# Patient Record
Sex: MALE | Race: WHITE | NOT HISPANIC OR LATINO | Employment: OTHER | URBAN - METROPOLITAN AREA
[De-identification: names, ages, dates, MRNs, and addresses within clinical notes are randomized per-mention and may not be internally consistent; named-entity substitution may affect disease eponyms.]

---

## 2017-03-21 ENCOUNTER — TELEPHONE (OUTPATIENT)
Dept: RHEUMATOLOGY | Facility: CLINIC | Age: 77
End: 2017-03-21

## 2017-03-22 ENCOUNTER — OFFICE VISIT (OUTPATIENT)
Dept: RHEUMATOLOGY | Facility: CLINIC | Age: 77
End: 2017-03-22
Payer: MEDICARE

## 2017-03-22 ENCOUNTER — HOSPITAL ENCOUNTER (OUTPATIENT)
Dept: RADIOLOGY | Facility: HOSPITAL | Age: 77
Discharge: HOME | End: 2017-03-22
Attending: INTERNAL MEDICINE
Payer: MEDICARE

## 2017-03-22 VITALS
WEIGHT: 226.88 LBS | HEART RATE: 64 BPM | RESPIRATION RATE: 18 BRPM | SYSTOLIC BLOOD PRESSURE: 136 MMHG | DIASTOLIC BLOOD PRESSURE: 76 MMHG

## 2017-03-22 DIAGNOSIS — M25.40 JOINT SWELLING: ICD-10-CM

## 2017-03-22 DIAGNOSIS — M25.40 JOINT SWELLING: Primary | ICD-10-CM

## 2017-03-22 PROCEDURE — 99999 PR PBB SHADOW E&M-EST. PATIENT-LVL III: CPT | Mod: PBBFAC,,, | Performed by: INTERNAL MEDICINE

## 2017-03-22 PROCEDURE — 1157F ADVNC CARE PLAN IN RCRD: CPT | Mod: S$GLB,,, | Performed by: INTERNAL MEDICINE

## 2017-03-22 PROCEDURE — 1159F MED LIST DOCD IN RCRD: CPT | Mod: S$GLB,,, | Performed by: INTERNAL MEDICINE

## 2017-03-22 PROCEDURE — 77077 JOINT SURVEY SINGLE VIEW: CPT | Mod: TC

## 2017-03-22 PROCEDURE — 99205 OFFICE O/P NEW HI 60 MIN: CPT | Mod: S$GLB,,, | Performed by: INTERNAL MEDICINE

## 2017-03-22 PROCEDURE — 1125F AMNT PAIN NOTED PAIN PRSNT: CPT | Mod: S$GLB,,, | Performed by: INTERNAL MEDICINE

## 2017-03-22 PROCEDURE — 77077 JOINT SURVEY SINGLE VIEW: CPT | Mod: 26,,, | Performed by: RADIOLOGY

## 2017-03-22 PROCEDURE — 1160F RVW MEDS BY RX/DR IN RCRD: CPT | Mod: S$GLB,,, | Performed by: INTERNAL MEDICINE

## 2017-03-22 RX ORDER — PREDNISONE 20 MG/1
TABLET ORAL
Qty: 20 TABLET | Refills: 0 | Status: SHIPPED | OUTPATIENT
Start: 2017-03-22 | End: 2017-05-17

## 2017-03-22 RX ORDER — CLOPIDOGREL BISULFATE 75 MG/1
TABLET ORAL
COMMUNITY
Start: 2016-12-18 | End: 2019-06-11

## 2017-03-22 RX ORDER — ALLOPURINOL 100 MG/1
100 TABLET ORAL DAILY
COMMUNITY
End: 2017-03-31 | Stop reason: ALTCHOICE

## 2017-03-22 RX ORDER — NAPROXEN SODIUM 220 MG/1
TABLET ORAL
COMMUNITY

## 2017-03-22 RX ORDER — SIMVASTATIN 80 MG/1
TABLET, FILM COATED ORAL
COMMUNITY
Start: 2017-01-07 | End: 2019-06-11

## 2017-03-22 RX ORDER — METOPROLOL TARTRATE 50 MG/1
TABLET ORAL
COMMUNITY
Start: 2017-01-07 | End: 2019-06-11

## 2017-03-22 RX ORDER — HYDROCHLOROTHIAZIDE 25 MG/1
TABLET ORAL
COMMUNITY
Start: 2016-12-18 | End: 2019-06-11

## 2017-03-22 RX ORDER — CELECOXIB 100 MG/1
CAPSULE ORAL
COMMUNITY
Start: 2017-03-20 | End: 2017-05-17

## 2017-03-22 RX ORDER — CAPTOPRIL 50 MG/1
50 TABLET ORAL 2 TIMES DAILY
COMMUNITY
End: 2019-06-11

## 2017-03-22 RX ORDER — POTASSIUM CHLORIDE 750 MG/1
TABLET, EXTENDED RELEASE ORAL
COMMUNITY
Start: 2016-12-18 | End: 2019-06-11

## 2017-03-22 ASSESSMENT — ROUTINE ASSESSMENT OF PATIENT INDEX DATA (RAPID3)
MDHAQ FUNCTION SCORE: .2
PAIN SCORE: 10
PSYCHOLOGICAL DISTRESS SCORE: 1.1
TOTAL RAPID3 SCORE: 5.22
WHEN YOU AWAKENED IN THE MORNING OVER THE LAST WEEK, PLEASE INDICATE THE AMOUNT OF TIME IT TAKES UNTIL YOU ARE AS LIMBER AS YOU WILL BE FOR THE DAY: 2 HR
FATIGUE SCORE: 0
PATIENT GLOBAL ASSESSMENT SCORE: 5

## 2017-03-22 NOTE — PROGRESS NOTES
Subjective:       Patient ID: Teddy Thomas is a 77 y.o. male.    Chief Complaint: Disease Management    HPI    78 yo with PMH of HTN, HL, CAD s/p stent,gout here for evaluation.  Reports pain in right wrist.  A few weeks ago, he had pain and swelling in left knee  And then it went to left ankle.  He reports swelling lasted a week and it went away.  He was placed on celebrex with no improvement.  He took prednisone for left ankle pain and it worked really fast. He reports he has had podagra in the past.  He has not been on regimen for gout.He has taken colcrys in the past with no improvement.  He reports stiffness in right wrist and right hand.  He does not drink alcohol. He avoids shell fish. Denies smoking.      Past Medical History:   Diagnosis Date    Arthritis     Gout     Hyperlipidemia     Hypertension     MI (myocardial infarction)     Myocardial infarction              Review of Systems   Constitutional: Negative for activity change, appetite change, chills, diaphoresis, fatigue and fever.   HENT: Negative for ear discharge, ear pain, hearing loss, mouth sores, nosebleeds and sinus pressure.    Eyes: Negative.  Negative for photophobia, pain, discharge, redness and itching.   Respiratory: Negative for cough, chest tightness and shortness of breath.    Cardiovascular: Negative for chest pain, palpitations and leg swelling.   Gastrointestinal: Negative for abdominal distention, blood in stool and nausea.   Endocrine: Negative for cold intolerance, heat intolerance, polydipsia and polyphagia.   Genitourinary: Negative for flank pain, genital sores and hematuria.   Musculoskeletal: Positive for arthralgias and joint swelling. Negative for back pain, gait problem, myalgias, neck pain and neck stiffness.   Skin: Negative for color change, pallor and rash.   Neurological: Negative for dizziness, weakness, light-headedness and headaches.   Hematological: Negative for adenopathy. Does not bruise/bleed easily.    Psychiatric/Behavioral: Negative for decreased concentration and hallucinations. The patient is not nervous/anxious.              Objective:   Resp 18  Wt 102.9 kg (226 lb 13.7 oz)     Physical Exam   Constitutional: No distress.   HENT:   Head: Normocephalic and atraumatic.   Right Ear: External ear normal.   Left Ear: External ear normal.   Eyes: Conjunctivae and EOM are normal. Pupils are equal, round, and reactive to light. Right eye exhibits no discharge. Left eye exhibits discharge. No scleral icterus.   Neck: Normal range of motion. Neck supple. No JVD present. No tracheal deviation present. No thyromegaly present.   Cardiovascular: Normal rate, regular rhythm, normal heart sounds and intact distal pulses.  Exam reveals no gallop and no friction rub.    No murmur heard.  Pulmonary/Chest: Effort normal and breath sounds normal. No stridor. No respiratory distress. He has no wheezes. He has no rales. He exhibits no tenderness.   Abdominal: Soft. Bowel sounds are normal. He exhibits no distension and no mass. There is no tenderness. There is no rebound and no guarding.   Lymphadenopathy:     He has no cervical adenopathy.   Skin: He is not diaphoretic.     Musculoskeletal:   Shoulders:FROM  Wrist:: synovitis in right wrist  Knees, ankles; no effusions  Feet: no synovitis                Assessment:     78 yo with PMH of HTN, HL, CAD s/p stent,gout here for evaluation.  He has right wrist tenderness but no swelling. I suspect he has gout vs palindromic RA.    No diagnosis found.        Plan:     stop celebrex  Start prednisone 40mg a day for 4 days then one pill daily  Labs, xrays  rtc next week  **

## 2017-03-22 NOTE — LETTER
March 22, 2017      Serenity Forbes, [facility]  [address] [place] [address]           [place]  [address]  [place] [address]  Phone: [phone]          Patient: Teddy Thomas   MR Number: [id number]   YOB: 1940   Date of Visit: 3/22/2017       Dear Serenity Forbes:    Thank you for referring Teddy Thomas to me for evaluation. Attached you will find relevant portions of my assessment and plan of care.    If you have questions, please do not hesitate to call me. I look forward to following Teddy Thomas along with you.    Sincerely,    Sandrita Narayanan MD    Enclosure  CC:  No Recipients    If you would like to receive this communication electronically, please contact [email or web address] or [phone] to request more information on [employer] Link access.    For providers and/or their staff who would like to refer a patient to [facility], please contact us through our one-stop-shop provider referral line, Ellie Mcknight, at [phone].    If you feel you have received this communication in error or would no longer like to receive these types of communications, please e-mail [email or web address]

## 2017-03-22 NOTE — MR AVS SNAPSHOT
[place]  [address] [place] [address] [address]  [place] [address]  Phone: [phone]                  Teddy Thomas   3/22/2017 1:00 PM   Office Visit    Description:  Male : 1940   Provider:  Sandrita Narayanan MD   Department:  [place]           Diagnoses this Visit        Comments    Joint swelling    -  Primary            To Do List           Future Appointments        Provider Department Dept Phone    3/22/2017 2:10 PM SAME DAY LAB, [facility] [phone]    3/22/2017 4:15 PM [facility] XR1 [facility] [phone]    3/31/2017 4:30 PM Sandrita Narayanan MD [facility] [phone]      Goals (5 Years of Data)     None      [facility] On Call     [facility] On [facility] Nurse Care Line -  Assistance  Registered nurses in the [facility] Center provide clinical advisement, health education, appointment booking, and other advisory services.  Call for this free service at [phone].             Medications           Message regarding Medications     Verify the changes and/or additions to your medication regime listed below are the same as discussed with your clinician today.  If any of these changes or additions are incorrect, please notify your healthcare provider.             Verify that the below list of medications is an accurate representation of the medications you are currently taking.  If none reported, the list may be blank. If incorrect, please contact your healthcare provider. Carry this list with you in case of emergency.           Current Medications     allopurinol (ZYLOPRIM) 100 MG tablet Take 100 mg by mouth once daily.    ASPIRIN ORAL Take 81 mg by mouth once daily.    captopril (CAPOTEN) 50 MG tablet Take 50 mg by mouth 2 (two) times daily.    celecoxib (CELEBREX) 100 MG capsule     clopidogrel (PLAVIX) 75 mg tablet     FOLIC ACID/MULTIVIT-MIN/LUTEIN (CENTRUM SILVER ORAL) Take by mouth once daily.     hydrochlorothiazide (HYDRODIURIL) 25 MG tablet     metoprolol tartrate (LOPRESSOR) 50 MG tablet     omega 3-dha-epa-fish oil 1,200 (144-216) mg Cap Take by mouth.    potassium chloride SA (K-DUR,KLOR-CON) 10 MEQ tablet     simvastatin (ZOCOR) 80 MG tablet            Clinical Reference Information           Your Vitals Were     BP Pulse Resp Weight          136/76 64 18 102.9 kg (226 lb 13.7 oz)        Blood Pressure          Most Recent Value    BP  136/76      Allergies as of 3/22/2017     Nystatin      Immunizations Administered on Date of Encounter - 3/22/2017     None      Orders Placed During Today's Visit     Future Labs/Procedures Expected by Expires    FLAVIO  3/22/2017 5/21/2018    C-reactive protein  3/22/2017 5/21/2018    CBC auto differential  3/22/2017 5/21/2018    Comprehensive metabolic panel  3/22/2017 5/21/2018    Cyclic citrul peptide antibody, IgG  3/22/2017 5/21/2018    Hepatitis B core antibody, IgM  3/22/2017 5/21/2018    Hepatitis B surface antibody  3/22/2017 5/21/2018    Hepatitis B surface antigen  3/22/2017 5/21/2018    Hepatitis C antibody  3/22/2017 5/21/2018    Rheumatoid factor  3/22/2017 5/21/2018    Sedimentation rate, manual  3/22/2017 5/21/2018    URIC ACID  3/22/2017 5/21/2018    XR Arthritis Survey  3/22/2017 3/22/2018      [facility] Sign-Up     Activating your [facility] account is as easy as 1-2-3!     1) Visit [email or web address], select Sign Up Now, enter this activation code and your date of birth, then select Next.  [email or web address]  Expires: 5/6/2017  1:43 PM      2) Create a username and password to use when you visit [facility] in the future and select a security question in case you lose your password and select Next.    3) Enter your e-mail address and click Sign Up!    Additional Information  If you have questions, please e-mail [email or web address] or call [phone] to talk to our [facility] staff. Remember, [facility] is NOT to be used for urgent needs. For medical  emergencies, dial 911.         Language Assistance Services     ATTENTION: Language assistance services are available, free of charge. Please call [phone].      ATENCIÓN: Si habla lexus, tiene a contreras disposición servicios gratuitos de asistencia lingüística. Llame al [phone].     CHÚ Ý: N?u b?n nói Ti?ng Vi?t, có các d?ch v? h? tr? ngôn ng? mi?n phí dành cho b?n. G?i s? [phone].         [facility] complies with applicable Federal civil rights laws and does not discriminate on the basis of race, color, national origin, age, disability, or sex.

## 2017-03-30 ENCOUNTER — TELEPHONE (OUTPATIENT)
Dept: RHEUMATOLOGY | Facility: CLINIC | Age: 77
End: 2017-03-30

## 2017-03-31 ENCOUNTER — OFFICE VISIT (OUTPATIENT)
Dept: RHEUMATOLOGY | Facility: CLINIC | Age: 77
End: 2017-03-31
Payer: MEDICARE

## 2017-03-31 VITALS
BODY MASS INDEX: 30.32 KG/M2 | DIASTOLIC BLOOD PRESSURE: 69 MMHG | SYSTOLIC BLOOD PRESSURE: 122 MMHG | HEART RATE: 94 BPM | HEIGHT: 72 IN | WEIGHT: 223.88 LBS

## 2017-03-31 DIAGNOSIS — M10.9 GOUTY ARTHRITIS: Primary | ICD-10-CM

## 2017-03-31 PROCEDURE — 1126F AMNT PAIN NOTED NONE PRSNT: CPT | Mod: S$GLB,,, | Performed by: INTERNAL MEDICINE

## 2017-03-31 PROCEDURE — 1157F ADVNC CARE PLAN IN RCRD: CPT | Mod: S$GLB,,, | Performed by: INTERNAL MEDICINE

## 2017-03-31 PROCEDURE — 1159F MED LIST DOCD IN RCRD: CPT | Mod: S$GLB,,, | Performed by: INTERNAL MEDICINE

## 2017-03-31 PROCEDURE — 1160F RVW MEDS BY RX/DR IN RCRD: CPT | Mod: S$GLB,,, | Performed by: INTERNAL MEDICINE

## 2017-03-31 PROCEDURE — 99999 PR PBB SHADOW E&M-EST. PATIENT-LVL III: CPT | Mod: PBBFAC,,, | Performed by: INTERNAL MEDICINE

## 2017-03-31 PROCEDURE — 99214 OFFICE O/P EST MOD 30 MIN: CPT | Mod: S$GLB,,, | Performed by: INTERNAL MEDICINE

## 2017-03-31 RX ORDER — COLCHICINE 0.6 MG/1
0.6 CAPSULE ORAL DAILY
Qty: 90 CAPSULE | Refills: 11 | Status: SHIPPED | OUTPATIENT
Start: 2017-03-31 | End: 2017-08-18 | Stop reason: SDUPTHER

## 2017-03-31 RX ORDER — ALLOPURINOL 300 MG/1
300 TABLET ORAL DAILY
Qty: 90 TABLET | Refills: 1 | Status: SHIPPED | OUTPATIENT
Start: 2017-03-31 | End: 2017-08-09 | Stop reason: ALTCHOICE

## 2017-03-31 ASSESSMENT — ROUTINE ASSESSMENT OF PATIENT INDEX DATA (RAPID3)
PSYCHOLOGICAL DISTRESS SCORE: 0
PAIN SCORE: 0
TOTAL RAPID3 SCORE: 1.67
PATIENT GLOBAL ASSESSMENT SCORE: 5
FATIGUE SCORE: 0
AM STIFFNESS SCORE: 0, NO
MDHAQ FUNCTION SCORE: 0

## 2017-03-31 NOTE — PROGRESS NOTES
Subjective:       Patient ID: Teddy Thomas is a 77 y.o. male.    Chief Complaint: Disease Management    HPI    76 yo with PMH of HTN, HL, CAD s/p stent,gout here for evaluation.  Reports pain in right wrist.  A few weeks ago, he had pain and swelling in left knee  And then it went to left ankle.  He reports swelling lasted a week and it went away.  He was placed on celebrex with no improvement.  He took prednisone for left ankle pain and it worked really fast. He reports he has had podagra in the past.  He has not been on regimen for gout.He has taken colcrys in the past with no improvement.  He reports stiffness in right wrist and right hand.  He does not drink alcohol. He avoids shell fish. Denies smoking.      Interval history: He took prednisone 40mg a day and with significant improvement with pain and swelling.  He denies any pain.  He reports his last gout flare was in left foot.      Past Medical History:   Diagnosis Date    Arthritis     Gout     Hyperlipidemia     Hypertension     MI (myocardial infarction)     Myocardial infarction              Review of Systems   Constitutional: Negative for activity change, appetite change, chills, diaphoresis, fatigue and fever.   HENT: Negative for ear discharge, ear pain, hearing loss, mouth sores, nosebleeds and sinus pressure.    Eyes: Negative.  Negative for photophobia, pain, discharge, redness and itching.   Respiratory: Negative for cough, chest tightness and shortness of breath.    Cardiovascular: Negative for chest pain, palpitations and leg swelling.   Gastrointestinal: Negative for abdominal distention, blood in stool and nausea.   Endocrine: Negative for cold intolerance, heat intolerance, polydipsia and polyphagia.   Genitourinary: Negative for flank pain, genital sores and hematuria.   Musculoskeletal: Positive for arthralgias and joint swelling. Negative for back pain, gait problem, myalgias, neck pain and neck stiffness.   Skin: Negative for  color change, pallor and rash.   Neurological: Negative for dizziness, weakness, light-headedness and headaches.   Hematological: Negative for adenopathy. Does not bruise/bleed easily.   Psychiatric/Behavioral: Negative for decreased concentration and hallucinations. The patient is not nervous/anxious.              Objective:        Physical Exam   Constitutional: No distress.   HENT:   Head: Normocephalic and atraumatic.   Right Ear: External ear normal.   Left Ear: External ear normal.   Eyes: Conjunctivae and EOM are normal. Pupils are equal, round, and reactive to light. Right eye exhibits no discharge. Left eye exhibits discharge. No scleral icterus.   Neck: Normal range of motion. Neck supple. No JVD present. No tracheal deviation present. No thyromegaly present.   Cardiovascular: Normal rate, regular rhythm, normal heart sounds and intact distal pulses.  Exam reveals no gallop and no friction rub.    No murmur heard.  Pulmonary/Chest: Effort normal and breath sounds normal. No stridor. No respiratory distress. He has no wheezes. He has no rales. He exhibits no tenderness.   Abdominal: Soft. Bowel sounds are normal. He exhibits no distension and no mass. There is no tenderness. There is no rebound and no guarding.   Lymphadenopathy:     He has no cervical adenopathy.   Skin: He is not diaphoretic.     Musculoskeletal:   Shoulders:FROM  Wrist:: synovitis in right wrist resolved  Knees, ankles; no effusions  Feet: no synovitis       Ccp,rf-negative  Sona-negative  Uric acid -8.4  Hep B, C-negative           Assessment:     78 yo with PMH of HTN, HL, CAD s/p stent,gout here for evaluation.  He has right wrist tenderness that resolved. He reports history of podagra and hyperuricemia but was previously taking allopurinol prn.  Instructed patient to take allopurinol daily and colchicine daily.  Plan:     Stop prednisone  Start allopurinol 300mg po qday ( risks discussed)  Start colchicine 0.6  Mg po qdday  NOTE MADE  OF ABNORMAL CREATININE. Asked him to follow up with pcp  Labs in 5 weeks    rtc next week  **

## 2017-03-31 NOTE — MR AVS SNAPSHOT
[facility]  [address]  [facility] [address]  Phone: [phone]  Fax: [phone]                  Teddy Thomas   3/31/2017 4:30 PM   Office Visit    Description:  Male : 1940   Provider:  Sandrita Narayanan MD   Department:  [facility]           Reason for Visit     Follow-up           Diagnoses this Visit        Comments    Gouty arthritis    -  Primary            To Do List           Future Appointments        Provider Department Dept Phone    2017 2:30 PM Sandrita Narayanan MD [place] [phone]      Goals (5 Years of Data)     None       These Medications        Disp Refills Start End    allopurinol (ZYLOPRIM) 300 MG tablet 90 tablet 1 3/31/2017     Take 1 tablet (300 mg total) by mouth once daily. - Oral    Pharmacy: [facility] - [address] Ph #: [phone]       colchicine 0.6 mg Cap 90 capsule 11 3/31/2017     Take 1 capsule (0.6 mg total) by mouth once daily. - Oral    Pharmacy: [facility] [place] [address] [facility] Ph #: [phone]       Notes to Pharmacy: PLEASE assist with obtaining patient medication at lowest price      [facility] On Call     [facility] On Call Nurse Care Line -  Assistance  Unless otherwise directed by your provider, please contact [facility], our nurse care line that is available for  assistance.     Registered nurses in the [facility] On Call Center provide: appointment scheduling, clinical advisement, health education, and other advisory services.  Call: [phone] (toll free)               Medications           Message regarding Medications     Verify the changes and/or additions to your medication regime listed below are the same as discussed with your clinician today.  If any of these changes or additions are incorrect, please notify your healthcare provider.        START taking these NEW medications        Refills     allopurinol (ZYLOPRIM) 300 MG tablet 1    Sig: Take 1 tablet (300 mg total) by mouth once daily.    Class: Normal    Route: Oral    colchicine 0.6 mg Cap 11    Sig: Take 1 capsule (0.6 mg total) by mouth once daily.    Class: Normal    Route: Oral           Verify that the below list of medications is an accurate representation of the medications you are currently taking.  If none reported, the list may be blank. If incorrect, please contact your healthcare provider. Carry this list with you in case of emergency.           Current Medications     ASPIRIN ORAL Take 81 mg by mouth once daily.    captopril (CAPOTEN) 50 MG tablet Take 50 mg by mouth 2 (two) times daily.    clopidogrel (PLAVIX) 75 mg tablet     FOLIC ACID/MULTIVIT-MIN/LUTEIN (CENTRUM SILVER ORAL) Take by mouth once daily.    hydrochlorothiazide (HYDRODIURIL) 25 MG tablet     metoprolol tartrate (LOPRESSOR) 50 MG tablet     omega 3-dha-epa-fish oil 1,200 (144-216) mg Cap Take by mouth.    potassium chloride SA (K-DUR,KLOR-CON) 10 MEQ tablet     predniSONE (DELTASONE) 20 MG tablet Take 2 pills daily for 4 days and the one pill daily    simvastatin (ZOCOR) 80 MG tablet     allopurinol (ZYLOPRIM) 300 MG tablet Take 1 tablet (300 mg total) by mouth once daily.    celecoxib (CELEBREX) 100 MG capsule     colchicine 0.6 mg Cap Take 1 capsule (0.6 mg total) by mouth once daily.           Clinical Reference Information           Your Vitals Were     BP Pulse Height Weight BMI    122/69 (BP Location: Left arm, Patient Position: Sitting, BP Method: Automatic) 94 6' (1.829 m) 101.6 kg (223 lb 14.4 oz) 30.37 kg/m2      Blood Pressure          Most Recent Value    BP  122/69      Allergies as of 3/31/2017     Nystatin      Immunizations Administered on Date of Encounter - 3/31/2017     None      Orders Placed During Today's Visit     Future Labs/Procedures Expected by Expires    Comprehensive metabolic panel  3/31/2017 5/30/2018    URIC ACID  3/31/2017 5/30/2018       [facility] Sign-Up     Activating your [facility] account is as easy as 1-2-3!     1) Visit [email or web address], select Sign Up Now, enter this activation code and your date of birth, then select Next.  [email or web address]  Expires: 5/6/2017  1:43 PM      2) Create a username and password to use when you visit [facility] in the future and select a security question in case you lose your password and select Next.    3) Enter your e-mail address and click Sign Up!    Additional Information  If you have questions, please e-mail [email or web address] or call [phone] to talk to our [facility] staff. Remember, [facility] is NOT to be used for urgent needs. For medical emergencies, dial 911.         Language Assistance Services     ATTENTION: Language assistance services are available, free of charge. Please call [phone].      ATENCIÓN: Si habla lexus, tiene a contreras disposición servicios gratuitos de asistencia lingüística. Llame al [phone].     CORDELL Ý: N?u b?n nói Ti?ng Vi?t, có các d?ch v? h? tr? ngôn ng? mi?n phí dành cho b?n. G?i s? [phone].         [facility] complies with applicable Federal civil rights laws and does not discriminate on the basis of race, color, national origin, age, disability, or sex.

## 2017-05-17 ENCOUNTER — OFFICE VISIT (OUTPATIENT)
Dept: RHEUMATOLOGY | Facility: CLINIC | Age: 77
End: 2017-05-17
Payer: MEDICARE

## 2017-05-17 VITALS
SYSTOLIC BLOOD PRESSURE: 124 MMHG | HEIGHT: 72 IN | RESPIRATION RATE: 18 BRPM | WEIGHT: 222.44 LBS | BODY MASS INDEX: 30.13 KG/M2 | DIASTOLIC BLOOD PRESSURE: 76 MMHG | HEART RATE: 70 BPM

## 2017-05-17 DIAGNOSIS — M10.9 GOUTY ARTHRITIS: Primary | ICD-10-CM

## 2017-05-17 PROCEDURE — 99999 PR PBB SHADOW E&M-EST. PATIENT-LVL III: CPT | Mod: PBBFAC,,, | Performed by: INTERNAL MEDICINE

## 2017-05-17 PROCEDURE — 1126F AMNT PAIN NOTED NONE PRSNT: CPT | Mod: S$GLB,,, | Performed by: INTERNAL MEDICINE

## 2017-05-17 PROCEDURE — 1160F RVW MEDS BY RX/DR IN RCRD: CPT | Mod: S$GLB,,, | Performed by: INTERNAL MEDICINE

## 2017-05-17 PROCEDURE — 99214 OFFICE O/P EST MOD 30 MIN: CPT | Mod: S$GLB,,, | Performed by: INTERNAL MEDICINE

## 2017-05-17 PROCEDURE — 1159F MED LIST DOCD IN RCRD: CPT | Mod: S$GLB,,, | Performed by: INTERNAL MEDICINE

## 2017-05-17 ASSESSMENT — ROUTINE ASSESSMENT OF PATIENT INDEX DATA (RAPID3)
AM STIFFNESS SCORE: 0, NO
MDHAQ FUNCTION SCORE: 0
PATIENT GLOBAL ASSESSMENT SCORE: 0
PSYCHOLOGICAL DISTRESS SCORE: 0
TOTAL RAPID3 SCORE: 0
FATIGUE SCORE: 0
PAIN SCORE: 0

## 2017-05-17 NOTE — PROGRESS NOTES
Subjective:       Patient ID: Teddy Thomas is a 77 y.o. male.    Chief Complaint: Disease Management    HPI    76 yo with PMH of HTN, HL, CAD s/p stent,gout here for evaluation.  Reports pain in right wrist.  A few weeks ago, he had pain and swelling in left knee  And then it went to left ankle.  He reports swelling lasted a week and it went away.  He was placed on celebrex with no improvement.  He took prednisone for left ankle pain and it worked really fast. He reports he has had podagra in the past.  He has not been on regimen for gout.He has taken colcrys in the past with no improvement.  He reports stiffness in right wrist and right hand.  He does not drink alcohol. He avoids shell fish. Denies smoking.      Interval history: He is taking allopurinol 300mg a day and colchicine 0.6mg a day.  He denies any pain. He is eating crawfish and oysters.      Past Medical History:   Diagnosis Date    Arthritis     Gout     Hyperlipidemia     Hypertension     MI (myocardial infarction)     Myocardial infarction              Review of Systems   Constitutional: Negative for activity change, appetite change, chills, diaphoresis, fatigue and fever.   HENT: Negative for ear discharge, ear pain, hearing loss, mouth sores, nosebleeds and sinus pressure.    Eyes: Negative.  Negative for photophobia, pain, discharge, redness and itching.   Respiratory: Negative for cough, chest tightness and shortness of breath.    Cardiovascular: Negative for chest pain, palpitations and leg swelling.   Gastrointestinal: Negative for abdominal distention, blood in stool and nausea.   Endocrine: Negative for cold intolerance, heat intolerance, polydipsia and polyphagia.   Genitourinary: Negative for flank pain, genital sores and hematuria.   Musculoskeletal: Positive for arthralgias and joint swelling. Negative for back pain, gait problem, myalgias, neck pain and neck stiffness.   Skin: Negative for color change, pallor and rash.    Neurological: Negative for dizziness, weakness, light-headedness and headaches.   Hematological: Negative for adenopathy. Does not bruise/bleed easily.   Psychiatric/Behavioral: Negative for decreased concentration and hallucinations. The patient is not nervous/anxious.              Objective:        Physical Exam   Constitutional: No distress.   HENT:   Head: Normocephalic and atraumatic.   Right Ear: External ear normal.   Left Ear: External ear normal.   Eyes: Conjunctivae and EOM are normal. Pupils are equal, round, and reactive to light. Right eye exhibits no discharge. Left eye exhibits discharge. No scleral icterus.   Neck: Normal range of motion. Neck supple. No JVD present. No tracheal deviation present. No thyromegaly present.   Cardiovascular: Normal rate, regular rhythm, normal heart sounds and intact distal pulses.  Exam reveals no gallop and no friction rub.    No murmur heard.  Pulmonary/Chest: Effort normal and breath sounds normal. No stridor. No respiratory distress. He has no wheezes. He has no rales. He exhibits no tenderness.   Abdominal: Soft. Bowel sounds are normal. He exhibits no distension and no mass. There is no tenderness. There is no rebound and no guarding.   Lymphadenopathy:     He has no cervical adenopathy.   Skin: He is not diaphoretic.     Musculoskeletal:   Shoulders:FROM  Wrist:: synovitis in right wrist resolved  Knees, ankles; no effusions  Feet: no synovitis       Ccp,rf-negative  Sona-negative  Uric acid -8.4  Hep B, C-negative    Assessment:     76 yo with PMH of HTN, HL, CAD s/p stent,gout here for follow up of gout.  He has right wrist tenderness that resolved. He reports history of podagra and hyperuricemia and also has history of right wrist tenderness now resolved.  Patient is almost at goal so will continue allopurinol at current dose.  He reports eating crawfish and oysters so I encouraged him compliance with diet.    Plan:   Continue allopurinol 300mg po qday   Of  note, patient reports he does not want to take colchicine because he feels great  NOTE MADE OF ABNORMAL CREATININE. Asked him to follow up with pcp  Labs in 11 weeks and appt in 12 weeks  **

## 2017-05-17 NOTE — MR AVS SNAPSHOT
[facility]  [address] [place] [address] [address]  [place] [address]  Phone: [phone]                  Teddy Thomas   2017 2:30 PM   Office Visit    Description:  Male : 1940   Provider:  Sandrita Narayanan MD   Department:  [facility]           Reason for Visit     Disease Management           Diagnoses this Visit        Comments    Gouty arthritis    -  Primary            To Do List           Future Appointments        Provider Department Dept Phone    2017 8:00 AM [facility] [phone]    2017 2:00 PM Sandrita Narayanan MD [facility] [phone]      Goals (5 Years of Data)     None      [facility]     [facility] Care Line -  Assistance  Unless otherwise directed by your provider, please contact [facility], our nurse care line that is available for  assistance.     Registered nurses in the [facility] provide: appointment scheduling, clinical advisement, health education, and other advisory services.  Call: [phone] (toll free)               Medications           Message regarding Medications     Verify the changes and/or additions to your medication regime listed below are the same as discussed with your clinician today.  If any of these changes or additions are incorrect, please notify your healthcare provider.        STOP taking these medications     celecoxib (CELEBREX) 100 MG capsule     predniSONE (DELTASONE) 20 MG tablet Take 2 pills daily for 4 days and the one pill daily           Verify that the below list of medications is an accurate representation of the medications you are currently taking.  If none reported, the list may be blank. If incorrect, please contact your healthcare provider. Carry this list with you in case of emergency.           Current Medications     allopurinol (ZYLOPRIM) 300 MG tablet Take 1 tablet (300 mg total) by mouth once daily.    ASPIRIN  ORAL Take 81 mg by mouth once daily.    captopril (CAPOTEN) 50 MG tablet Take 50 mg by mouth 2 (two) times daily.    clopidogrel (PLAVIX) 75 mg tablet     colchicine 0.6 mg Cap Take 1 capsule (0.6 mg total) by mouth once daily.    FOLIC ACID/MULTIVIT-MIN/LUTEIN (CENTRUM SILVER ORAL) Take by mouth once daily.    hydrochlorothiazide (HYDRODIURIL) 25 MG tablet     metoprolol tartrate (LOPRESSOR) 50 MG tablet     omega 3-dha-epa-fish oil 1,200 (144-216) mg Cap Take by mouth.    potassium chloride SA (K-DUR,KLOR-CON) 10 MEQ tablet     simvastatin (ZOCOR) 80 MG tablet            Clinical Reference Information           Your Vitals Were     BP Pulse Resp Height Weight BMI    124/76 70 18 6' (1.829 m) 100.9 kg (222 lb 7.1 oz) 30.17 kg/m2      Blood Pressure          Most Recent Value    BP  124/76      Allergies as of 5/17/2017     Nystatin      Immunizations Administered on Date of Encounter - 5/17/2017     None      Orders Placed During Today's Visit     Future Labs/Procedures Expected by Expires    Comprehensive metabolic panel  5/17/2017 7/16/2018    URIC ACID  5/17/2017 7/16/2018      Language Assistance Services     ATTENTION: Language assistance services are available, free of charge. Please call [phone].      ATENCIÓN: Si nick alberts, tiene a contreras disposición servicios gratuitos de asistencia lingüística. Llame al [phone].     CORDELL Ý: N?u b?n nói Ti?ng Vi?t, có các d?ch v? h? tr? ngôn ng? mi?n phí dành cho b?n. G?i s? [phone].         [facility] complies with applicable Federal civil rights laws and does not discriminate on the basis of race, color, national origin, age, disability, or sex.

## 2017-08-09 ENCOUNTER — OFFICE VISIT (OUTPATIENT)
Dept: RHEUMATOLOGY | Facility: CLINIC | Age: 77
End: 2017-08-09
Payer: MEDICARE

## 2017-08-09 VITALS
RESPIRATION RATE: 18 BRPM | WEIGHT: 220.25 LBS | HEIGHT: 72 IN | SYSTOLIC BLOOD PRESSURE: 140 MMHG | BODY MASS INDEX: 29.83 KG/M2 | DIASTOLIC BLOOD PRESSURE: 72 MMHG | HEART RATE: 72 BPM

## 2017-08-09 DIAGNOSIS — M10.9 GOUTY ARTHRITIS: Primary | ICD-10-CM

## 2017-08-09 DIAGNOSIS — Z71.3 DIETARY COUNSELING: ICD-10-CM

## 2017-08-09 PROCEDURE — 1126F AMNT PAIN NOTED NONE PRSNT: CPT | Mod: S$GLB,,, | Performed by: INTERNAL MEDICINE

## 2017-08-09 PROCEDURE — 99214 OFFICE O/P EST MOD 30 MIN: CPT | Mod: S$GLB,,, | Performed by: INTERNAL MEDICINE

## 2017-08-09 PROCEDURE — 99999 PR PBB SHADOW E&M-EST. PATIENT-LVL III: CPT | Mod: PBBFAC,,, | Performed by: INTERNAL MEDICINE

## 2017-08-09 PROCEDURE — 1159F MED LIST DOCD IN RCRD: CPT | Mod: S$GLB,,, | Performed by: INTERNAL MEDICINE

## 2017-08-09 RX ORDER — COLCHICINE 0.6 MG/1
0.6 TABLET ORAL DAILY
Qty: 90 TABLET | Refills: 6 | Status: SHIPPED | OUTPATIENT
Start: 2017-08-09 | End: 2017-08-09 | Stop reason: SDUPTHER

## 2017-08-09 RX ORDER — COLCHICINE 0.6 MG/1
TABLET, FILM COATED ORAL
COMMUNITY
Start: 2017-08-08 | End: 2019-06-11

## 2017-08-09 RX ORDER — COLCHICINE 0.6 MG/1
TABLET ORAL
Qty: 60 TABLET | Refills: 6 | Status: SHIPPED | OUTPATIENT
Start: 2017-08-09 | End: 2019-06-11

## 2017-08-09 RX ORDER — ALLOPURINOL 300 MG/1
300 TABLET ORAL DAILY
Qty: 90 TABLET | Refills: 1 | Status: SHIPPED | OUTPATIENT
Start: 2017-08-09

## 2017-08-09 RX ORDER — ALLOPURINOL 100 MG/1
400 TABLET ORAL DAILY
Qty: 120 TABLET | Refills: 1 | Status: SHIPPED | OUTPATIENT
Start: 2017-08-09 | End: 2017-08-09 | Stop reason: ALTCHOICE

## 2017-08-09 NOTE — PROGRESS NOTES
Subjective:       Patient ID: Teddy Thomas is a 77 y.o. male.    Chief Complaint: Disease Management    HPI    76 yo with PMH of HTN, HL, CAD s/p stent,gout here for evaluation.  Reports pain in right wrist.  A few weeks ago, he had pain and swelling in left knee  And then it went to left ankle.  He reports swelling lasted a week and it went away.  He was placed on celebrex with no improvement.  He took prednisone for left ankle pain and it worked really fast. He reports he has had podagra in the past.  He has not been on regimen for gout.He has taken colcrys in the past with no improvement.  He reports stiffness in right wrist and right hand.  He does not drink alcohol. He avoids shell fish. Denies smoking.      Interval history:  He had one flare. Reports he had flare in left big toe and tool colcrys for a week and it went away. He is taking allopurinol 300mg a day.  He denies any pain. He is eating crawfish and oysters.      Past Medical History:   Diagnosis Date    Arthritis     Gout     Hyperlipidemia     Hypertension     MI (myocardial infarction)     Myocardial infarction              Review of Systems   Constitutional: Negative for activity change, appetite change, chills, diaphoresis, fatigue and fever.   HENT: Negative for ear discharge, ear pain, hearing loss, mouth sores, nosebleeds and sinus pressure.    Eyes: Negative.  Negative for photophobia, pain, discharge, redness and itching.   Respiratory: Negative for cough, chest tightness and shortness of breath.    Cardiovascular: Negative for chest pain, palpitations and leg swelling.   Gastrointestinal: Negative for abdominal distention, blood in stool and nausea.   Endocrine: Negative for cold intolerance, heat intolerance, polydipsia and polyphagia.   Genitourinary: Negative for flank pain, genital sores and hematuria.   Musculoskeletal: Positive for arthralgias and joint swelling. Negative for back pain, gait problem, myalgias, neck pain and  neck stiffness.   Skin: Negative for color change, pallor and rash.   Neurological: Negative for dizziness, weakness, light-headedness and headaches.   Hematological: Negative for adenopathy. Does not bruise/bleed easily.   Psychiatric/Behavioral: Negative for decreased concentration and hallucinations. The patient is not nervous/anxious.              Objective:        Physical Exam   Constitutional: No distress.   HENT:   Head: Normocephalic and atraumatic.   Right Ear: External ear normal.   Left Ear: External ear normal.   Eyes: Conjunctivae and EOM are normal. Pupils are equal, round, and reactive to light. Right eye exhibits no discharge. Left eye exhibits discharge. No scleral icterus.   Neck: Normal range of motion. Neck supple. No JVD present. No tracheal deviation present. No thyromegaly present.   Cardiovascular: Normal rate, regular rhythm, normal heart sounds and intact distal pulses.  Exam reveals no gallop and no friction rub.    No murmur heard.  Pulmonary/Chest: Effort normal and breath sounds normal. No stridor. No respiratory distress. He has no wheezes. He has no rales. He exhibits no tenderness.   Abdominal: Soft. Bowel sounds are normal. He exhibits no distension and no mass. There is no tenderness. There is no rebound and no guarding.   Lymphadenopathy:     He has no cervical adenopathy.   Skin: He is not diaphoretic.     Musculoskeletal:   Shoulders:FROM  Wrist:: synovitis in right wrist resolved  Knees, ankles; no effusions  Feet: no synovitis       Ccp,rf-negative  Sona-negative  Uric acid -8.4  Hep B, C-negative    Assessment:     78 yo with PMH of HTN, HL, CAD s/p stent,gout here for follow up of gout.  He has right wrist tenderness that resolved. He reports history of podagra and hyperuricemia and also has history of right wrist tenderness now resolved.  Patient is almost at goal so will continue allopurinol at current dose.  He reports eating crawfish and oysters so I encouraged him  compliance with diet since he had one flare since last visit.    Plan:   Continue allopurinol 300mg po qday   Start colcrys 0.6 mg po qday  Labs in 3 months  Gave handout on gout and discussed gout diet extensively

## 2017-08-18 RX ORDER — COLCHICINE 0.6 MG/1
0.6 CAPSULE ORAL DAILY
Qty: 90 CAPSULE | Refills: 0 | Status: SHIPPED | OUTPATIENT
Start: 2017-08-18 | End: 2019-06-11

## 2017-08-23 ENCOUNTER — TELEPHONE (OUTPATIENT)
Dept: RHEUMATOLOGY | Facility: CLINIC | Age: 77
End: 2017-08-23

## 2019-06-11 ENCOUNTER — OFFICE VISIT (OUTPATIENT)
Dept: UROLOGY | Facility: CLINIC | Age: 79
End: 2019-06-11
Payer: MEDICARE

## 2019-06-11 VITALS — BODY MASS INDEX: 29.8 KG/M2 | WEIGHT: 220 LBS | HEIGHT: 72 IN

## 2019-06-11 DIAGNOSIS — R97.20 ELEVATED PSA: Primary | ICD-10-CM

## 2019-06-11 DIAGNOSIS — R35.1 NOCTURIA: ICD-10-CM

## 2019-06-11 PROCEDURE — 99204 OFFICE O/P NEW MOD 45 MIN: CPT | Mod: S$GLB,,, | Performed by: UROLOGY

## 2019-06-11 PROCEDURE — 99999 PR PBB SHADOW E&M-EST. PATIENT-LVL III: CPT | Mod: PBBFAC,,, | Performed by: UROLOGY

## 2019-06-11 PROCEDURE — 99204 PR OFFICE/OUTPT VISIT, NEW, LEVL IV, 45-59 MIN: ICD-10-PCS | Mod: S$GLB,,, | Performed by: UROLOGY

## 2019-06-11 PROCEDURE — 99999 PR PBB SHADOW E&M-EST. PATIENT-LVL III: ICD-10-PCS | Mod: PBBFAC,,, | Performed by: UROLOGY

## 2019-06-11 PROCEDURE — 1101F PR PT FALLS ASSESS DOC 0-1 FALLS W/OUT INJ PAST YR: ICD-10-PCS | Mod: CPTII,S$GLB,, | Performed by: UROLOGY

## 2019-06-11 PROCEDURE — 1101F PT FALLS ASSESS-DOCD LE1/YR: CPT | Mod: CPTII,S$GLB,, | Performed by: UROLOGY

## 2019-06-11 RX ORDER — EZETIMIBE 10 MG/1
TABLET ORAL
COMMUNITY
Start: 2019-04-17

## 2019-06-11 RX ORDER — METOPROLOL TARTRATE 50 MG/1
TABLET ORAL
COMMUNITY

## 2019-06-11 RX ORDER — CLOPIDOGREL BISULFATE 75 MG/1
TABLET ORAL
COMMUNITY

## 2019-06-11 RX ORDER — CAPTOPRIL 50 MG/1
TABLET ORAL
COMMUNITY

## 2019-06-11 RX ORDER — SIMVASTATIN 80 MG/1
TABLET, FILM COATED ORAL
COMMUNITY

## 2019-06-11 RX ORDER — COLCHICINE 0.6 MG/1
TABLET ORAL
COMMUNITY

## 2019-06-11 RX ORDER — POTASSIUM CHLORIDE 750 MG/1
TABLET, EXTENDED RELEASE ORAL
COMMUNITY

## 2019-06-11 RX ORDER — HYDROCHLOROTHIAZIDE 25 MG/1
TABLET ORAL
COMMUNITY

## 2019-06-11 NOTE — LETTER
June 11, 2019      Serenity Forbes, [facility]-[address] [place] [address]           [facility]  [address] [place] [address] [address]  [place] [address]  Phone: [phone]  Fax: [phone]          Patient: Teddy Thomas   MR Number: [id number]   YOB: 1940   Date of Visit: 6/11/2019       Dear Serenity Forbes:    Thank you for referring Teddy Thomas to me for evaluation. Attached you will find relevant portions of my assessment and plan of care.    If you have questions, please do not hesitate to call me. I look forward to following Teddy Thomas along with you.    Sincerely,    Pankaj Ely MD    Enclosure  CC:  No Recipients    If you would like to receive this communication electronically, please contact [email or web address] or [phone] to request more information on [facility] [facility] access.    For providers and/or their staff who would like to refer a patient to [facility], please contact us through our one-stop-shop provider referral line, [facility], at [phone].    If you feel you have received this communication in error or would no longer like to receive these types of communications, please e-mail [email or web address]

## 2019-06-11 NOTE — PROGRESS NOTES
Subjective:       Patient ID: Teddy Thomas is a 79 y.o. male.    Chief Complaint: Elevated PSA    79 y o WM with rising PSA of 4.5 ng/ml. No symptoms. Referred  For evaluation from Serenity Forbes MD.    Other   This is a new (rising PSA) problem. The current episode started 1 to 4 weeks ago. The problem occurs constantly. The problem has been unchanged. Pertinent negatives include no abdominal pain, anorexia, arthralgias, change in bowel habit, chest pain, chills, congestion, coughing, diaphoresis, fatigue, fever, headaches, joint swelling, myalgias, nausea, neck pain, numbness, rash, sore throat, swollen glands, urinary symptoms, vertigo, visual change, vomiting or weakness. Nothing aggravates the symptoms. He has tried nothing for the symptoms.     Review of Systems   Constitutional: Negative for activity change, appetite change, chills, diaphoresis, fatigue, fever and unexpected weight change.   HENT: Negative for congestion, hearing loss, sinus pressure, sore throat and trouble swallowing.    Eyes: Negative for photophobia, pain, discharge and visual disturbance.   Respiratory: Negative for apnea, cough and shortness of breath.    Cardiovascular: Negative for chest pain, palpitations and leg swelling.   Gastrointestinal: Negative for abdominal distention, abdominal pain, anal bleeding, anorexia, blood in stool, change in bowel habit, constipation, diarrhea, nausea, rectal pain and vomiting.   Endocrine: Negative for cold intolerance, heat intolerance, polydipsia, polyphagia and polyuria.   Genitourinary: Negative for decreased urine volume, difficulty urinating, discharge, dysuria, enuresis, flank pain, frequency, genital sores, hematuria, penile pain, penile swelling, scrotal swelling, testicular pain and urgency.   Musculoskeletal: Negative for arthralgias, back pain, joint swelling, myalgias and neck pain.   Skin: Negative for color change, pallor, rash and wound.   Allergic/Immunologic: Negative for  environmental allergies, food allergies and immunocompromised state.   Neurological: Negative for dizziness, vertigo, seizures, weakness, numbness and headaches.   Hematological: Negative for adenopathy. Does not bruise/bleed easily.   Psychiatric/Behavioral: Negative.        Objective:      Physical Exam   Nursing note and vitals reviewed.  Constitutional: He is oriented to person, place, and time. He appears well-developed and well-nourished.   HENT:   Head: Normocephalic.   Nose: Nose normal.   Mouth/Throat: Oropharynx is clear and moist.   Eyes: Conjunctivae and EOM are normal. Pupils are equal, round, and reactive to light.   Neck: Normal range of motion. Neck supple.   Cardiovascular: Normal rate, regular rhythm, normal heart sounds and intact distal pulses.    Pulmonary/Chest: Effort normal and breath sounds normal.   Abdominal: Soft. Bowel sounds are normal.   Genitourinary: Rectum normal, testes normal and penis normal. Prostate is enlarged. Prostate is not tender. Circumcised.   Genitourinary Comments: Prostate, smooth, no lumps, dorsal groove, enlarged   Musculoskeletal: Normal range of motion.   Neurological: He is alert and oriented to person, place, and time. He has normal reflexes.   Skin: Skin is warm and dry.     Psychiatric: He has a normal mood and affect. His behavior is normal. Judgment and thought content normal.       Assessment:       1. Elevated PSA    2. Nocturia        Plan:       Patient Instructions   Check Free and Total PSA  If normal F/U in 6 months  If PSA abnormal  Plan prostate biopsy

## 2021-01-05 ENCOUNTER — PATIENT MESSAGE (OUTPATIENT)
Dept: ADMINISTRATIVE | Facility: OTHER | Age: 81
End: 2021-01-05

## 2021-01-09 ENCOUNTER — IMMUNIZATION (OUTPATIENT)
Dept: FAMILY MEDICINE | Facility: CLINIC | Age: 81
End: 2021-01-09
Payer: MEDICARE

## 2021-01-09 DIAGNOSIS — Z23 NEED FOR VACCINATION: ICD-10-CM

## 2021-01-09 PROCEDURE — 91300 COVID-19, MRNA, LNP-S, PF, 30 MCG/0.3 ML DOSE VACCINE: CPT | Mod: PBBFAC | Performed by: FAMILY MEDICINE

## 2021-01-30 ENCOUNTER — IMMUNIZATION (OUTPATIENT)
Dept: FAMILY MEDICINE | Facility: CLINIC | Age: 81
End: 2021-01-30
Payer: MEDICARE

## 2021-01-30 DIAGNOSIS — Z23 NEED FOR VACCINATION: Primary | ICD-10-CM

## 2021-01-30 PROCEDURE — 91300 COVID-19, MRNA, LNP-S, PF, 30 MCG/0.3 ML DOSE VACCINE: CPT | Mod: PBBFAC | Performed by: FAMILY MEDICINE

## 2021-01-30 PROCEDURE — 0002A COVID-19, MRNA, LNP-S, PF, 30 MCG/0.3 ML DOSE VACCINE: CPT | Mod: PBBFAC | Performed by: FAMILY MEDICINE

## 2021-12-07 ENCOUNTER — IMMUNIZATION (OUTPATIENT)
Dept: FAMILY MEDICINE | Facility: CLINIC | Age: 81
End: 2021-12-07
Payer: MEDICARE

## 2021-12-07 DIAGNOSIS — Z23 NEED FOR VACCINATION: Primary | ICD-10-CM

## 2021-12-07 PROCEDURE — 0004A COVID-19, MRNA, LNP-S, PF, 30 MCG/0.3 ML DOSE VACCINE: CPT | Mod: PBBFAC | Performed by: FAMILY MEDICINE
